# Patient Record
Sex: MALE | Race: WHITE | Employment: FULL TIME | URBAN - METROPOLITAN AREA
[De-identification: names, ages, dates, MRNs, and addresses within clinical notes are randomized per-mention and may not be internally consistent; named-entity substitution may affect disease eponyms.]

---

## 2021-12-15 ENCOUNTER — HOSPITAL ENCOUNTER (OUTPATIENT)
Age: 36
Discharge: HOME | End: 2021-12-15

## 2021-12-16 LAB — RUBELLA: 50.8 IU/ML

## 2021-12-17 LAB
HBV SURFACE AB TITR SER: NEGATIVE {TITER}
MUMPS IGG TITER: 0.88
VZV IGG SER QL IA: 1.8

## 2021-12-20 LAB — RUBEOLA IGG: < 5 AU/ML

## 2022-03-19 ENCOUNTER — APPOINTMENT (OUTPATIENT)
Dept: CT IMAGING | Age: 37
End: 2022-03-19
Payer: COMMERCIAL

## 2022-03-19 ENCOUNTER — APPOINTMENT (OUTPATIENT)
Dept: GENERAL RADIOLOGY | Age: 37
End: 2022-03-19
Payer: COMMERCIAL

## 2022-03-19 ENCOUNTER — HOSPITAL ENCOUNTER (EMERGENCY)
Age: 37
Discharge: HOME | End: 2022-03-19
Payer: COMMERCIAL

## 2022-03-19 VITALS
DIASTOLIC BLOOD PRESSURE: 82 MMHG | RESPIRATION RATE: 18 BRPM | HEART RATE: 77 BPM | OXYGEN SATURATION: 100 % | BODY MASS INDEX: 22.49 KG/M2 | SYSTOLIC BLOOD PRESSURE: 111 MMHG | TEMPERATURE: 97.7 F | WEIGHT: 135 LBS | HEIGHT: 65 IN

## 2022-03-19 DIAGNOSIS — S22.32XA CLOSED FRACTURE OF ONE RIB OF LEFT SIDE, INITIAL ENCOUNTER: Primary | ICD-10-CM

## 2022-03-19 LAB
ANION GAP SERPL CALCULATED.3IONS-SCNC: 10 MEQ/L (ref 8–16)
BASOPHILS # BLD: 1 %
BASOPHILS ABSOLUTE: 0 THOU/MM3 (ref 0–0.1)
BUN BLDV-MCNC: 10 MG/DL (ref 7–22)
CALCIUM SERPL-MCNC: 9 MG/DL (ref 8.5–10.5)
CHLORIDE BLD-SCNC: 106 MEQ/L (ref 98–111)
CO2: 25 MEQ/L (ref 23–33)
CREAT SERPL-MCNC: 0.7 MG/DL (ref 0.4–1.2)
EOSINOPHIL # BLD: 1.3 %
EOSINOPHILS ABSOLUTE: 0.1 THOU/MM3 (ref 0–0.4)
ERYTHROCYTE [DISTWIDTH] IN BLOOD BY AUTOMATED COUNT: 11.9 % (ref 11.5–14.5)
ERYTHROCYTE [DISTWIDTH] IN BLOOD BY AUTOMATED COUNT: 43.4 FL (ref 35–45)
GFR SERPL CREATININE-BSD FRML MDRD: > 90 ML/MIN/1.73M2
GLUCOSE BLD-MCNC: 90 MG/DL (ref 70–108)
HCT VFR BLD CALC: 45.4 % (ref 42–52)
HEMOGLOBIN: 15.4 GM/DL (ref 14–18)
IMMATURE GRANS (ABS): 0.01 THOU/MM3 (ref 0–0.07)
IMMATURE GRANULOCYTES: 0.3 %
LYMPHOCYTES # BLD: 39.1 %
LYMPHOCYTES ABSOLUTE: 1.5 THOU/MM3 (ref 1–4.8)
MCH RBC QN AUTO: 33.4 PG (ref 26–33)
MCHC RBC AUTO-ENTMCNC: 33.9 GM/DL (ref 32.2–35.5)
MCV RBC AUTO: 98.5 FL (ref 80–94)
MONOCYTES # BLD: 12.7 %
MONOCYTES ABSOLUTE: 0.5 THOU/MM3 (ref 0.4–1.3)
NUCLEATED RED BLOOD CELLS: 0 /100 WBC
OSMOLALITY CALCULATION: 279.8 MOSMOL/KG (ref 275–300)
PLATELET # BLD: 301 THOU/MM3 (ref 130–400)
PMV BLD AUTO: 8.6 FL (ref 9.4–12.4)
POTASSIUM SERPL-SCNC: 4.5 MEQ/L (ref 3.5–5.2)
RBC # BLD: 4.61 MILL/MM3 (ref 4.7–6.1)
SEG NEUTROPHILS: 45.6 %
SEGMENTED NEUTROPHILS ABSOLUTE COUNT: 1.8 THOU/MM3 (ref 1.8–7.7)
SODIUM BLD-SCNC: 141 MEQ/L (ref 135–145)
WBC # BLD: 3.9 THOU/MM3 (ref 4.8–10.8)

## 2022-03-19 PROCEDURE — 2580000003 HC RX 258: Performed by: PHYSICIAN ASSISTANT

## 2022-03-19 PROCEDURE — 6360000004 HC RX CONTRAST MEDICATION: Performed by: PHYSICIAN ASSISTANT

## 2022-03-19 PROCEDURE — 36415 COLL VENOUS BLD VENIPUNCTURE: CPT

## 2022-03-19 PROCEDURE — 71260 CT THORAX DX C+: CPT

## 2022-03-19 PROCEDURE — 80048 BASIC METABOLIC PNL TOTAL CA: CPT

## 2022-03-19 PROCEDURE — 85025 COMPLETE CBC W/AUTO DIFF WBC: CPT

## 2022-03-19 PROCEDURE — 74177 CT ABD & PELVIS W/CONTRAST: CPT

## 2022-03-19 PROCEDURE — 99284 EMERGENCY DEPT VISIT MOD MDM: CPT

## 2022-03-19 PROCEDURE — 71046 X-RAY EXAM CHEST 2 VIEWS: CPT

## 2022-03-19 PROCEDURE — 6370000000 HC RX 637 (ALT 250 FOR IP): Performed by: PHYSICIAN ASSISTANT

## 2022-03-19 RX ORDER — LIDOCAINE 50 MG/G
1 PATCH TOPICAL DAILY
Qty: 15 PATCH | Refills: 0 | Status: SHIPPED | OUTPATIENT
Start: 2022-03-19

## 2022-03-19 RX ORDER — 0.9 % SODIUM CHLORIDE 0.9 %
500 INTRAVENOUS SOLUTION INTRAVENOUS ONCE
Status: COMPLETED | OUTPATIENT
Start: 2022-03-19 | End: 2022-03-19

## 2022-03-19 RX ORDER — LIDOCAINE 4 G/G
1 PATCH TOPICAL ONCE
Status: DISCONTINUED | OUTPATIENT
Start: 2022-03-19 | End: 2022-03-19 | Stop reason: HOSPADM

## 2022-03-19 RX ORDER — DIAZEPAM 2 MG/1
2 TABLET ORAL EVERY 6 HOURS PRN
Qty: 14 TABLET | Refills: 0 | Status: SHIPPED | OUTPATIENT
Start: 2022-03-19 | End: 2022-03-26

## 2022-03-19 RX ORDER — DIAZEPAM 2 MG/1
2 TABLET ORAL ONCE
Status: COMPLETED | OUTPATIENT
Start: 2022-03-19 | End: 2022-03-19

## 2022-03-19 RX ORDER — IBUPROFEN 800 MG/1
800 TABLET ORAL EVERY 6 HOURS PRN
COMMUNITY

## 2022-03-19 RX ADMIN — DIAZEPAM 2 MG: 2 TABLET ORAL at 10:02

## 2022-03-19 RX ADMIN — IOPAMIDOL 80 ML: 755 INJECTION, SOLUTION INTRAVENOUS at 10:23

## 2022-03-19 RX ADMIN — SODIUM CHLORIDE 500 ML: 9 INJECTION, SOLUTION INTRAVENOUS at 10:08

## 2022-03-19 ASSESSMENT — PAIN DESCRIPTION - PAIN TYPE: TYPE: ACUTE PAIN

## 2022-03-19 ASSESSMENT — PAIN SCALES - GENERAL: PAINLEVEL_OUTOF10: 5

## 2022-03-19 ASSESSMENT — PAIN DESCRIPTION - LOCATION: LOCATION: RIB CAGE

## 2022-03-19 NOTE — ED NOTES
Pt medicated and updated on POC. Vitals updated. Call within reach. Lights dimmed. Door shut. Will continue to monitor for safety and comfort.      Isha Gonzalez RN  03/19/22 1015

## 2022-03-19 NOTE — ED PROVIDER NOTES
[facility]  eMERGENCY dEPARTMENT eNCOUnter          CHIEF COMPLAINT       Chief Complaint   Patient presents with    Rib Pain       Nurses Notes reviewed and I agree except as noted inthe HPI. HISTORY OF PRESENT ILLNESS    Tayler Fulton is a 39 y.o. male who presents to the Emergency Department for the evaluation of left rib pain. Patient states that last weekend he rode a bowl for the first time since sustaining some left chest wall injuries 5 years ago, for which he was never seen or treated. He states that during his ride 1 week ago, he fell off and was stepped on. States that he was able to rest the following day before returning to work on Monday. 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 3 days ago, he sneezed and the pain in the left posterior ribs dropped him to the floor. He describes it as a sharp, stabbing pain that made him unable to catch his breath. States he feels a repeated popping at the same location in the back that was initially occurring with every breath after the injury and then recurred after sneezing. He has recurrent sharp, stabbing pain with every cough or sneeze now and pain is mildly improved with hot showers and 800 mg Motrin. States that holding a position of flexion also provide some improvement. He had difficulty sleeping last night, ultimately having to lay on his stomach for comfort and decided to come in for evaluation as he plans to return to bull riding and just wanted to make sure everything was okay and get some pain control. He denies any syncope, shortness of breath, dizziness, fevers or abnormal bleeding. The HPI was provided by the patient. REVIEW OF SYSTEMS     Review of Systems   Cardiovascular: Positive for chest pain (posterior ribs). All other systems reviewed and are negative.       PAST MEDICAL HISTORY    has no past medical history on file. SURGICAL HISTORY      has a past surgical history that includes Clavicle excision and Clavicle surgery (Left). CURRENT MEDICATIONS       Discharge Medication List as of 3/19/2022 11:04 AM      CONTINUE these medications which have NOT CHANGED    Details   ibuprofen (ADVIL;MOTRIN) 800 MG tablet Take 800 mg by mouth every 6 hours as needed for PainHistorical Med      acetaminophen-codeine (TYLENOL #3) 300-30 MG per tablet Take 1 tablet by mouth every 6 hours as needed for Pain, Disp-12 tablet, R-0      naproxen (NAPROSYN) 500 MG tablet Take 1 tablet by mouth 2 times daily, Disp-60 tablet, R-0             ALLERGIES     has No Known Allergies. FAMILY HISTORY     has no family status information on file. family history is not on file. SOCIAL HISTORY      reports that he has been smoking cigarettes. He has been smoking about 0.50 packs per day. He has quit using smokeless tobacco. He reports current drug use. Drug: Marijuana Norberto Don). He reports that he does not drink alcohol. PHYSICAL EXAM     INITIAL VITALS:  height is 5' 5\" (1.651 m) and weight is 135 lb (61.2 kg). His oral temperature is 97.7 °F (36.5 °C). His blood pressure is 111/82 and his pulse is 77. His respiration is 18 and oxygen saturation is 100%. Physical Exam  Vitals and nursing note reviewed. Constitutional:       Appearance: Normal appearance. HENT:      Head: Normocephalic and atraumatic. Eyes:      Conjunctiva/sclera: Conjunctivae normal.   Cardiovascular:      Rate and Rhythm: Normal rate and regular rhythm. Heart sounds: Normal heart sounds. No murmur heard. Pulmonary:      Effort: Pulmonary effort is normal. No respiratory distress. Breath sounds: Normal breath sounds. No wheezing. Chest:      Chest wall: No mass, deformity, tenderness or crepitus. Abdominal:      Palpations: Abdomen is soft. Tenderness: There is abdominal tenderness. There is left CVA tenderness.  There is no right CVA tenderness, guarding or rebound. Musculoskeletal:      Cervical back: Normal range of motion. Skin:     General: Skin is warm and dry. Comments: No bruising, abrasion, laceration or cutaneous evidence of injury   Neurological:      General: No focal deficit present. Mental Status: He is alert and oriented to person, place, and time. Psychiatric:         Mood and Affect: Mood normal.         DIFFERENTIAL DIAGNOSIS:   Differential diagnoses are discussed    DIAGNOSTIC RESULTS     EKG: All EKG's are interpreted by the Emergency Department Physician who either signs or Co-signsthis chart in the absence of a cardiologist.      RADIOLOGY: non-plain film images(s) such as CT, Ultrasound and MRI are read by the radiologist.    [employer] [employer]   Final Result   1. Old healed fracture left clavicle. 2. There are a few benign-appearing subcentimeter cysts scattered in the liver. 3. Acute mildly displaced fracture left 11th rib posteriorly. **This report has been created using voice recognition software. It may contain minor errors which are inherent in voice recognition technology. **      Final report electronically signed by Dr. Naeem Quinteros on 3/19/2022 10:51 AM      CT ABDOMEN PELVIS W IV CONTRAST Additional Contrast? None   Final Result   1. Old healed fracture left clavicle. 2. There are a few benign-appearing subcentimeter cysts scattered in the liver. 3. Acute mildly displaced fracture left 11th rib posteriorly. **This report has been created using voice recognition software. It may contain minor errors which are inherent in voice recognition technology. **      Final report electronically signed by Dr. Naeem Quinteros on 3/19/2022 10:51 AM      XR CHEST (2 VW)   Final Result   1. Normal chest. No acute findings. 2. Old healed fracture midshaft left clavicle with metallic hardware in place.             **This report has been created using voice recognition software. It may contain minor errors which are inherent in voice recognition technology. **      Final report electronically signed by Dr. Phillip Leiva on 3/19/2022 10:13 AM          LABS:      Labs Reviewed   CBC WITH AUTO DIFFERENTIAL - Abnormal; Notable for the following components:       Result Value    WBC 3.9 (*)     RBC 4.61 (*)     MCV 98.5 (*)     MCH 33.4 (*)     MPV 8.6 (*)     All other components within normal limits   BASIC METABOLIC PANEL   ANION GAP   GLOMERULAR FILTRATION RATE, ESTIMATED   OSMOLALITY       EMERGENCY DEPARTMENT COURSE:   Vitals:    Vitals:    03/19/22 0938 03/19/22 1048   BP: (!) 127/95 111/82   Pulse: 77    Resp: 18    Temp: 97.7 °F (36.5 °C)    TempSrc: Oral    SpO2: 99% 100%   Weight: 135 lb (61.2 kg)    Height: 5' 5\" (1.651 m)       10:35 AM EDT: The patient was seen and evaluated. Patient presents 1 week after rib injury complaining of persistent pain. He arrives with reassuring vital signs, oxygen saturation in the high 90s to 100% on room air throughout his ED stay. He has x-ray of the chest showing an old healed fracture of the midshaft of the left clavicle with no acute findings otherwise. Stable hemoglobin otherwise noncontributory laboratory studies. He has reproducible tenderness in the posterior lower ribs on exam and given patient's plan of participating in bowl riding again shortly, CT was obtained to exclude any underlying injury to the kidney or chest.  CT reveals old healed fracture of the left clavicle, benign-appearing subcentimeter cysts scattered in the liver and an acute mildly displaced fracture of the left 11th rib posteriorly. Patient reported improvement in pain after Valium and lidocaine patch and feels comfortable with discharge home with the same. Supportive care of rib fractures discussed as well as return precautions.   Importance of deep breathing to prevent development of pneumonia was emphasized and patient was scheduled with follow-up as an outpatient for recheck and further monitoring. He is agreeable with avoiding any activities that could cause reinjury while the right is healing and denied further needs upon discharge. CRITICAL CARE:   None    CONSULTS:  None    PROCEDURES:  None    FINAL IMPRESSION      1. Closed fracture of one rib of left side, initial encounter          DISPOSITION/PLAN   Discharge    PATIENT REFERRED TO:  [facility] Internal Medicine    Go in 5 days  as scheduled below    [facility] EMERGENCY DEPT  [facility]  [phone]    If symptoms worsen      DISCHARGEMEDICATIONS:  Discharge Medication List as of 3/19/2022 11:04 AM      START taking these medications    Details   lidocaine (LIDODERM) 5 % Place 1 patch onto the skin daily 12 hours on, 12 hours off., Disp-15 patch, R-0Normal      diazePAM (VALIUM) 2 MG tablet Take 1 tablet by mouth every 6 hours as needed (muscle spasm) for up to 7 days. , Disp-14 tablet, R-0Normal             (Please note that portions of this note were completedwith a voice recognition program.  Efforts were made to edit the dictations but occasionally words are mis-transcribed.)        Rey Carl PA-C  03/19/22 5754

## 2022-03-19 NOTE — ED TRIAGE NOTES
Pt to ED w/rprts of lower left and back rib pain for the past three days following an injury w/a bull. States he rides bulls, fell and was clipped by back leg approximately one week from today's visit. Rprts 3 days ago sneezed and it aggravated symptoms. Pt rprts 5/10 pain w/no activity and increased w/minor twisting motion or exertion. Pt alert and oriented x4. Breathing easy and unlabored on RA.